# Patient Record
Sex: MALE | Race: OTHER | NOT HISPANIC OR LATINO | URBAN - METROPOLITAN AREA
[De-identification: names, ages, dates, MRNs, and addresses within clinical notes are randomized per-mention and may not be internally consistent; named-entity substitution may affect disease eponyms.]

---

## 2021-08-12 ENCOUNTER — INPATIENT (INPATIENT)
Facility: HOSPITAL | Age: 31
LOS: 2 days | Discharge: HOME | DRG: 917 | End: 2021-08-15
Attending: HOSPITALIST | Admitting: HOSPITALIST
Payer: MEDICAID

## 2021-08-12 VITALS
RESPIRATION RATE: 14 BRPM | HEART RATE: 119 BPM | TEMPERATURE: 98 F | OXYGEN SATURATION: 96 % | SYSTOLIC BLOOD PRESSURE: 116 MMHG | DIASTOLIC BLOOD PRESSURE: 79 MMHG

## 2021-08-12 DIAGNOSIS — D72.829 ELEVATED WHITE BLOOD CELL COUNT, UNSPECIFIED: ICD-10-CM

## 2021-08-12 DIAGNOSIS — R06.89 OTHER ABNORMALITIES OF BREATHING: ICD-10-CM

## 2021-08-12 DIAGNOSIS — T40.2X1A POISONING BY OTHER OPIOIDS, ACCIDENTAL (UNINTENTIONAL), INITIAL ENCOUNTER: ICD-10-CM
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
## 2021-08-12 DIAGNOSIS — G92 TOXIC ENCEPHALOPATHY: ICD-10-CM

## 2021-08-12 LAB
ALBUMIN SERPL ELPH-MCNC: 3.8 G/DL — SIGNIFICANT CHANGE UP (ref 3.5–5)
ALP SERPL-CCNC: 85 U/L — SIGNIFICANT CHANGE UP (ref 40–120)
ALT FLD-CCNC: 41 U/L DA — SIGNIFICANT CHANGE UP (ref 10–60)
ANION GAP SERPL CALC-SCNC: 5 MMOL/L — SIGNIFICANT CHANGE UP (ref 5–17)
AST SERPL-CCNC: 24 U/L — SIGNIFICANT CHANGE UP (ref 10–40)
BASOPHILS # BLD AUTO: 0.05 K/UL — SIGNIFICANT CHANGE UP (ref 0–0.2)
BASOPHILS NFR BLD AUTO: 0.3 % — SIGNIFICANT CHANGE UP (ref 0–2)
BILIRUB SERPL-MCNC: 0.5 MG/DL — SIGNIFICANT CHANGE UP (ref 0.2–1.2)
BUN SERPL-MCNC: 9 MG/DL — SIGNIFICANT CHANGE UP (ref 7–18)
CALCIUM SERPL-MCNC: 9.3 MG/DL — SIGNIFICANT CHANGE UP (ref 8.4–10.5)
CHLORIDE SERPL-SCNC: 107 MMOL/L — SIGNIFICANT CHANGE UP (ref 96–108)
CO2 SERPL-SCNC: 30 MMOL/L — SIGNIFICANT CHANGE UP (ref 22–31)
CREAT SERPL-MCNC: 1.04 MG/DL — SIGNIFICANT CHANGE UP (ref 0.5–1.3)
EOSINOPHIL # BLD AUTO: 0.12 K/UL — SIGNIFICANT CHANGE UP (ref 0–0.5)
EOSINOPHIL NFR BLD AUTO: 0.8 % — SIGNIFICANT CHANGE UP (ref 0–6)
GLUCOSE SERPL-MCNC: 114 MG/DL — HIGH (ref 70–99)
HCT VFR BLD CALC: 44 % — SIGNIFICANT CHANGE UP (ref 39–50)
HGB BLD-MCNC: 14.6 G/DL — SIGNIFICANT CHANGE UP (ref 13–17)
IMM GRANULOCYTES NFR BLD AUTO: 0.8 % — SIGNIFICANT CHANGE UP (ref 0–1.5)
LYMPHOCYTES # BLD AUTO: 1.86 K/UL — SIGNIFICANT CHANGE UP (ref 1–3.3)
LYMPHOCYTES # BLD AUTO: 12.8 % — LOW (ref 13–44)
MCHC RBC-ENTMCNC: 28.2 PG — SIGNIFICANT CHANGE UP (ref 27–34)
MCHC RBC-ENTMCNC: 33.2 GM/DL — SIGNIFICANT CHANGE UP (ref 32–36)
MCV RBC AUTO: 84.9 FL — SIGNIFICANT CHANGE UP (ref 80–100)
MONOCYTES # BLD AUTO: 0.98 K/UL — HIGH (ref 0–0.9)
MONOCYTES NFR BLD AUTO: 6.7 % — SIGNIFICANT CHANGE UP (ref 2–14)
NEUTROPHILS # BLD AUTO: 11.41 K/UL — HIGH (ref 1.8–7.4)
NEUTROPHILS NFR BLD AUTO: 78.6 % — HIGH (ref 43–77)
NRBC # BLD: 0 /100 WBCS — SIGNIFICANT CHANGE UP (ref 0–0)
PLATELET # BLD AUTO: 277 K/UL — SIGNIFICANT CHANGE UP (ref 150–400)
POTASSIUM SERPL-MCNC: 3.9 MMOL/L — SIGNIFICANT CHANGE UP (ref 3.5–5.3)
POTASSIUM SERPL-SCNC: 3.9 MMOL/L — SIGNIFICANT CHANGE UP (ref 3.5–5.3)
PROT SERPL-MCNC: 7.2 G/DL — SIGNIFICANT CHANGE UP (ref 6–8.3)
RBC # BLD: 5.18 M/UL — SIGNIFICANT CHANGE UP (ref 4.2–5.8)
RBC # FLD: 12.8 % — SIGNIFICANT CHANGE UP (ref 10.3–14.5)
SODIUM SERPL-SCNC: 142 MMOL/L — SIGNIFICANT CHANGE UP (ref 135–145)
WBC # BLD: 14.53 K/UL — HIGH (ref 3.8–10.5)
WBC # FLD AUTO: 14.53 K/UL — HIGH (ref 3.8–10.5)

## 2021-08-12 RX ORDER — SODIUM CHLORIDE 9 MG/ML
3 INJECTION INTRAMUSCULAR; INTRAVENOUS; SUBCUTANEOUS EVERY 8 HOURS
Refills: 0 | Status: DISCONTINUED | OUTPATIENT
Start: 2021-08-12 | End: 2021-08-15

## 2021-08-12 RX ORDER — SODIUM CHLORIDE 9 MG/ML
1000 INJECTION INTRAMUSCULAR; INTRAVENOUS; SUBCUTANEOUS ONCE
Refills: 0 | Status: COMPLETED | OUTPATIENT
Start: 2021-08-12 | End: 2021-08-12

## 2021-08-12 RX ORDER — NALOXONE HYDROCHLORIDE 4 MG/.1ML
0.4 SPRAY NASAL ONCE
Refills: 0 | Status: COMPLETED | OUTPATIENT
Start: 2021-08-12 | End: 2021-08-12

## 2021-08-12 RX ADMIN — NALOXONE HYDROCHLORIDE 0.4 MILLIGRAM(S): 4 SPRAY NASAL at 23:47

## 2021-08-12 RX ADMIN — NALOXONE HYDROCHLORIDE 0.4 MILLIGRAM(S): 4 SPRAY NASAL at 22:20

## 2021-08-12 RX ADMIN — SODIUM CHLORIDE 1000 MILLILITER(S): 9 INJECTION INTRAMUSCULAR; INTRAVENOUS; SUBCUTANEOUS at 23:44

## 2021-08-12 NOTE — ED PROVIDER NOTE - PROGRESS NOTE DETAILS
After accepting pt to the med floor, nurse admin made med team aware that narcan drips must go to ICU. ICU consulted. Pt now taken off narcan drip 30 minutes ago and still maintaining normal RR and mentation. ICU likely to reject. Will change dispo if they accept. Pt stable.

## 2021-08-12 NOTE — ED ADULT TRIAGE NOTE - CHIEF COMPLAINT QUOTE
per EMS friend called states, Patient came out of the bathroom and passed out in bed.  Responsive to vigorous pain stimuli.  Narcan 0.5 given @ about 820Pm w/ slow response

## 2021-08-12 NOTE — ED PROVIDER NOTE - CLINICAL SUMMARY MEDICAL DECISION MAKING FREE TEXT BOX
Patient presenting with likely opioid overdose with clear response to Narcan in the emergency room. Patient now talking and breathing comfortably. Will observe for four hours. Patient stable and will reassess. Patient presenting with likely opioid overdose with clear response to Narcan in the emergency room. Patient now talking and breathing comfortably. Will observe for four hours. Patient stable and will reassess.    Pt required 2x 0.4 IVP narcan in the ED and then placed on narcan drip at 1mg/hr. Pt now comfortable and conversant with nml RR. Admitting to medicine as pt is currently dependent on narcan drip. Pt stable and endorsed to med team.

## 2021-08-12 NOTE — ED PROVIDER NOTE - OBJECTIVE STATEMENT
29 y/o male with history of heroin abuse, presenting with overdose on heroin. Friend called 911 after seeing patient collapse after leaving bathroom. Patient received 0.5 mg Narcan from EMS at 8:20 PM with positive response. Patient states he was heroin tonight and denies any other substances. Patient now conversant s/p second dose Narcan in the ER and denies any complaints. Patient denies recent fever, chest pain, shortness of breath, focal numbness/weakness, nausea, vomiting, diarrhea, or any other recent illnesses and hospitalizations.

## 2021-08-12 NOTE — ED PROVIDER NOTE - PHYSICAL EXAMINATION
Vital Signs Reviewed--Respiratory rate 6 initially, s/p 0.4 mg Narcan respiratory rate 20  GEN: Comfortable, NAD, AAOx3  HEENT: NCAT, MMM, Neck Supple  RESP: CTAB, No rales/rhonchi/wheezing  CV: RRR, S1S2, No murmurs  ABD: No TTP, Soft, ND, No masses, No CVA Tenderness  Extrem/Skin: Equal pulses bilat, No cyanosis/edema/rashes  Neuro: No focal deficits

## 2021-08-13 DIAGNOSIS — Z29.9 ENCOUNTER FOR PROPHYLACTIC MEASURES, UNSPECIFIED: ICD-10-CM

## 2021-08-13 DIAGNOSIS — T40.2X1A POISONING BY OTHER OPIOIDS, ACCIDENTAL (UNINTENTIONAL), INITIAL ENCOUNTER: ICD-10-CM

## 2021-08-13 DIAGNOSIS — D72.829 ELEVATED WHITE BLOOD CELL COUNT, UNSPECIFIED: ICD-10-CM

## 2021-08-13 LAB
ALBUMIN SERPL ELPH-MCNC: 3.5 G/DL — SIGNIFICANT CHANGE UP (ref 3.5–5)
ALP SERPL-CCNC: 84 U/L — SIGNIFICANT CHANGE UP (ref 40–120)
ALT FLD-CCNC: 34 U/L DA — SIGNIFICANT CHANGE UP (ref 10–60)
AMPHET UR-MCNC: POSITIVE
ANION GAP SERPL CALC-SCNC: 2 MMOL/L — LOW (ref 5–17)
ANION GAP SERPL CALC-SCNC: 6 MMOL/L — SIGNIFICANT CHANGE UP (ref 5–17)
APAP SERPL-MCNC: <10 UG/ML — SIGNIFICANT CHANGE UP (ref 10–30)
AST SERPL-CCNC: 19 U/L — SIGNIFICANT CHANGE UP (ref 10–40)
BARBITURATES UR SCN-MCNC: NEGATIVE — SIGNIFICANT CHANGE UP
BASE EXCESS BLDA CALC-SCNC: 3.4 MMOL/L — HIGH (ref -2–3)
BENZODIAZ UR-MCNC: NEGATIVE — SIGNIFICANT CHANGE UP
BILIRUB SERPL-MCNC: 0.8 MG/DL — SIGNIFICANT CHANGE UP (ref 0.2–1.2)
BLOOD GAS COMMENTS ARTERIAL: SIGNIFICANT CHANGE UP
BUN SERPL-MCNC: 5 MG/DL — LOW (ref 7–18)
BUN SERPL-MCNC: 6 MG/DL — LOW (ref 7–18)
CALCIUM SERPL-MCNC: 8.6 MG/DL — SIGNIFICANT CHANGE UP (ref 8.4–10.5)
CALCIUM SERPL-MCNC: 8.7 MG/DL — SIGNIFICANT CHANGE UP (ref 8.4–10.5)
CHLORIDE SERPL-SCNC: 106 MMOL/L — SIGNIFICANT CHANGE UP (ref 96–108)
CHLORIDE SERPL-SCNC: 107 MMOL/L — SIGNIFICANT CHANGE UP (ref 96–108)
CO2 SERPL-SCNC: 26 MMOL/L — SIGNIFICANT CHANGE UP (ref 22–31)
CO2 SERPL-SCNC: 30 MMOL/L — SIGNIFICANT CHANGE UP (ref 22–31)
COCAINE METAB.OTHER UR-MCNC: NEGATIVE — SIGNIFICANT CHANGE UP
CREAT SERPL-MCNC: 0.66 MG/DL — SIGNIFICANT CHANGE UP (ref 0.5–1.3)
CREAT SERPL-MCNC: 0.68 MG/DL — SIGNIFICANT CHANGE UP (ref 0.5–1.3)
ETHANOL SERPL-MCNC: <3 MG/DL — SIGNIFICANT CHANGE UP (ref 0–10)
GLUCOSE SERPL-MCNC: 85 MG/DL — SIGNIFICANT CHANGE UP (ref 70–99)
GLUCOSE SERPL-MCNC: 85 MG/DL — SIGNIFICANT CHANGE UP (ref 70–99)
HCO3 BLDA-SCNC: 30 MMOL/L — HIGH (ref 21–28)
HCT VFR BLD CALC: 43.4 % — SIGNIFICANT CHANGE UP (ref 39–50)
HGB BLD-MCNC: 14.2 G/DL — SIGNIFICANT CHANGE UP (ref 13–17)
HOROWITZ INDEX BLDA+IHG-RTO: 21 — SIGNIFICANT CHANGE UP
MCHC RBC-ENTMCNC: 28 PG — SIGNIFICANT CHANGE UP (ref 27–34)
MCHC RBC-ENTMCNC: 32.7 GM/DL — SIGNIFICANT CHANGE UP (ref 32–36)
MCV RBC AUTO: 85.4 FL — SIGNIFICANT CHANGE UP (ref 80–100)
METHADONE UR-MCNC: NEGATIVE — SIGNIFICANT CHANGE UP
NRBC # BLD: 0 /100 WBCS — SIGNIFICANT CHANGE UP (ref 0–0)
OPIATES UR-MCNC: POSITIVE
PCO2 BLDA: 50 MMHG — HIGH (ref 35–48)
PCP SPEC-MCNC: SIGNIFICANT CHANGE UP
PCP UR-MCNC: NEGATIVE — SIGNIFICANT CHANGE UP
PH BLDA: 7.38 — SIGNIFICANT CHANGE UP (ref 7.35–7.45)
PLATELET # BLD AUTO: 251 K/UL — SIGNIFICANT CHANGE UP (ref 150–400)
PO2 BLDA: 103 MMHG — SIGNIFICANT CHANGE UP (ref 83–108)
POTASSIUM SERPL-MCNC: 3.6 MMOL/L — SIGNIFICANT CHANGE UP (ref 3.5–5.3)
POTASSIUM SERPL-MCNC: 3.7 MMOL/L — SIGNIFICANT CHANGE UP (ref 3.5–5.3)
POTASSIUM SERPL-SCNC: 3.6 MMOL/L — SIGNIFICANT CHANGE UP (ref 3.5–5.3)
POTASSIUM SERPL-SCNC: 3.7 MMOL/L — SIGNIFICANT CHANGE UP (ref 3.5–5.3)
PROT SERPL-MCNC: 6.8 G/DL — SIGNIFICANT CHANGE UP (ref 6–8.3)
RBC # BLD: 5.08 M/UL — SIGNIFICANT CHANGE UP (ref 4.2–5.8)
RBC # FLD: 12.9 % — SIGNIFICANT CHANGE UP (ref 10.3–14.5)
SALICYLATES SERPL-MCNC: <1.7 MG/DL — LOW (ref 2.8–20)
SAO2 % BLDA: 99 % — SIGNIFICANT CHANGE UP
SARS-COV-2 RNA SPEC QL NAA+PROBE: SIGNIFICANT CHANGE UP
SODIUM SERPL-SCNC: 138 MMOL/L — SIGNIFICANT CHANGE UP (ref 135–145)
SODIUM SERPL-SCNC: 139 MMOL/L — SIGNIFICANT CHANGE UP (ref 135–145)
THC UR QL: NEGATIVE — SIGNIFICANT CHANGE UP
WBC # BLD: 12.59 K/UL — HIGH (ref 3.8–10.5)
WBC # FLD AUTO: 12.59 K/UL — HIGH (ref 3.8–10.5)

## 2021-08-13 PROCEDURE — 99222 1ST HOSP IP/OBS MODERATE 55: CPT

## 2021-08-13 PROCEDURE — 12345: CPT | Mod: NC

## 2021-08-13 RX ORDER — NALOXONE HYDROCHLORIDE 4 MG/.1ML
1 SPRAY NASAL
Qty: 2 | Refills: 0 | Status: DISCONTINUED | OUTPATIENT
Start: 2021-08-13 | End: 2021-08-13

## 2021-08-13 RX ORDER — NALOXONE HYDROCHLORIDE 4 MG/.1ML
0.4 SPRAY NASAL ONCE
Refills: 0 | Status: COMPLETED | OUTPATIENT
Start: 2021-08-13 | End: 2021-08-13

## 2021-08-13 RX ORDER — SODIUM CHLORIDE 9 MG/ML
1000 INJECTION INTRAMUSCULAR; INTRAVENOUS; SUBCUTANEOUS
Refills: 0 | Status: DISCONTINUED | OUTPATIENT
Start: 2021-08-13 | End: 2021-08-14

## 2021-08-13 RX ADMIN — SODIUM CHLORIDE 3 MILLILITER(S): 9 INJECTION INTRAMUSCULAR; INTRAVENOUS; SUBCUTANEOUS at 21:12

## 2021-08-13 RX ADMIN — SODIUM CHLORIDE 150 MILLILITER(S): 9 INJECTION INTRAMUSCULAR; INTRAVENOUS; SUBCUTANEOUS at 13:47

## 2021-08-13 RX ADMIN — NALOXONE HYDROCHLORIDE 250 MG/HR: 4 SPRAY NASAL at 01:14

## 2021-08-13 RX ADMIN — SODIUM CHLORIDE 3 MILLILITER(S): 9 INJECTION INTRAMUSCULAR; INTRAVENOUS; SUBCUTANEOUS at 14:37

## 2021-08-13 RX ADMIN — SODIUM CHLORIDE 3 MILLILITER(S): 9 INJECTION INTRAMUSCULAR; INTRAVENOUS; SUBCUTANEOUS at 05:49

## 2021-08-13 NOTE — H&P ADULT - PROBLEM SELECTOR PLAN 3
No DVT ppx   IMPROVE VTE Individual Risk Assessment  RISK                                                                Points  [  ] Previous VTE                                                  3  [  ] Thrombophilia                                               2  [  ] Lower limb paralysis                                      2        (unable to hold up >15 seconds)    [  ] Current Cancer                                              2         (within 6 months)  [  ] Immobilization > 24 hrs                                1  [  ] ICU/CCU stay > 24 hours                              1  [  ] Age > 60                                                      1  IMPROVE VTE Score ____0_____

## 2021-08-13 NOTE — CONSULT NOTE ADULT - ASSESSMENT
Patient, a 30 year old male with no significant PMH presents to the ED with drug overdose due to heroine. Patient received narcan x 2 and was initially on narcan drip in ED, now off the drip. ICU was consulted for heroin overdose requiring narcan drip.      ASSESSMENT:  -Acute encephalopathy due to drug overdose  -Heroine overdose  -Bradypnea  -Leucocytosis    PLAN:    Heroine overdose:  -Patient presented with heroine overdose along with GHP  -He was bradypneic with pinpoint pupils on exam   -Utox + for opioids and amphetamines  -s/p narcan by EMS and in ED  -now off narcan drip   -respiratory status improved, pt awake and conscious  -ABGs showed 7.38/50/103/30  -since patient does not need narcan drip anymore, patient can be admitted to medical floor  -closely monitor on floor with concern for airway protection  -recommend Social work consullt  -continue with IV hydration    Patient, a 30 year old male with no significant PMH presents to the ED with drug overdose due to heroine. Patient received narcan x 2 and was initially on narcan drip in ED, now off the drip. ICU was consulted for heroin overdose requiring narcan drip.      ASSESSMENT:  -Acute encephalopathy due to drug overdose  -Heroine overdose  -Bradypnea  -Leucocytosis    PLAN:    Heroine overdose:  -Patient presented with heroine overdose along with GHP  -He was bradypneic with pinpoint pupils on exam   -Utox + for opioids and amphetamines  -s/p narcan by EMS and in ED  -now off narcan drip   -respiratory status improved, pt awake and conscious  -ABGs showed 7.38/50/103/30  -since patient does not need narcan drip anymore, patient can be admitted to medical floor  -closely monitor on floor with concern for airway protection  -recommend Social work consullt  -continue with IV hydration     Leucocytosis:  -WBC count 14.5k  -likely reactive  -monitor CBC     Patient, a 30 year old male with no significant PMH presents to the ED with drug overdose due to heroine. Patient received narcan x 2 and was initially on narcan drip in ED, now off the drip. ICU was consulted for heroin overdose requiring narcan drip.      ASSESSMENT:  -Acute encephalopathy due to drug overdose  -Heroine overdose  -Bradypnea  -Leucocytosis    PLAN:    Heroine overdose:  -Patient presented with heroine overdose along with GHP  -He was bradypneic with pinpoint pupils on exam   -Utox + for opioids and amphetamines  -s/p narcan by EMS and in ED  -now off narcan drip   -respiratory status improved, pt awake and conscious  -ABGs showed 7.38/50/103/30  -since patient does not need narcan drip anymore, patient can be admitted to medical floor  -closely monitor on floor with concern for airway protection  -recommend Social work consullt  -continue with IV hydration     Leucocytosis:  -WBC count 14.5k  -likely reactive  -monitor CBC    Disposition: downgrade to floor

## 2021-08-13 NOTE — CONSULT NOTE ADULT - SUBJECTIVE AND OBJECTIVE BOX
Patient is a 30y old  Male who presents with a chief complaint of     HPI:      PAST MEDICAL & SURGICAL HISTORY:  No pertinent past medical history    No significant past surgical history        SOCIAL HX:   Smoking                         ETOH                            Other    FAMILY HISTORY:  :  No known cardiovacular family hisotry     ROS:  See HPI     Allergies    Allergy Status Unknown    Intolerances          PHYSICAL EXAM    ICU Vital Signs Last 24 Hrs  T(C): 36.4 (13 Aug 2021 02:15), Max: 36.4 (12 Aug 2021 20:52)  T(F): 97.6 (13 Aug 2021 02:15), Max: 97.6 (13 Aug 2021 02:15)  HR: 90 (13 Aug 2021 02:15) (79 - 119)  BP: 124/66 (13 Aug 2021 02:15) (116/79 - 137/87)  BP(mean): --  ABP: --  ABP(mean): --  RR: 12 (13 Aug 2021 02:15) (9 - 20)  SpO2: 100% (13 Aug 2021 02:15) (96% - 100%)      General: Not in distress  HEENT:  MALCOM              Lymphatic system: No LN  Lungs: Bilateral BS  Cardiovascular: Regular  Gastrointestinal: Soft, Positive BS  Musculoskeletal: No clubbing.  Moves all extremities.    Skin: Warm.  Intact  Neurological: No motor or sensory deficit         LABS:                          14.6   14.53 )-----------( 277      ( 12 Aug 2021 23:19 )             44.0                                               08-12    142  |  107  |  9   ----------------------------<  114<H>  3.9   |  30  |  1.04    Ca    9.3      12 Aug 2021 23:19    TPro  7.2  /  Alb  3.8  /  TBili  0.5  /  DBili  x   /  AST  24  /  ALT  41  /  AlkPhos  85  08-12                                                                                           LIVER FUNCTIONS - ( 12 Aug 2021 23:19 )  Alb: 3.8 g/dL / Pro: 7.2 g/dL / ALK PHOS: 85 U/L / ALT: 41 U/L DA / AST: 24 U/L / GGT: x                                                                                                                                   ABG - ( 13 Aug 2021 03:55 )  pH, Arterial: 7.38  pH, Blood: x     /  pCO2: 50    /  pO2: 103   / HCO3: 30    / Base Excess: 3.4   /  SaO2: 99                  CXR:    ECHO:    MEDICATIONS  (STANDING):  naloxone Infusion 1 mG/Hr (250 mL/Hr) IV Continuous <Continuous>  sodium chloride 0.9% lock flush 3 milliLiter(s) IV Push every 8 hours    MEDICATIONS  (PRN):         Patient is a 30y old  Male who presents with a chief complaint of drug overdose/    HPI:  Patient, a 30 year old male with no significant PMH presents to the ED with drug overdose. Patient states he was at a party and had too much to drink. As per ED attending, 'Friend called 911 after seeing patient collapse after leaving bathroom. Patient received 0.5 mg Narcan from EMS at 8:20 PM with positive response. Patient states he was taking heroin tonight and denies any other substances. Patient now conversant s/p second dose Narcan in the ER and denies any complaints. Patient denies recent fever, chest pain, shortness of breath, focal numbness/weakness, nausea, vomiting, diarrhea, or any other recent illnesses and hospitalizations. '        PAST MEDICAL & SURGICAL HISTORY:  No pertinent past medical history    No significant past surgical history        SOCIAL HX:   Smoking                         ETOH                            Other    FAMILY HISTORY:  :  No known cardiovascular family history     ROS:  See HPI     Allergies    Allergy Status Unknown    Intolerances          PHYSICAL EXAM    ICU Vital Signs Last 24 Hrs  T(C): 36.4 (13 Aug 2021 02:15), Max: 36.4 (12 Aug 2021 20:52)  T(F): 97.6 (13 Aug 2021 02:15), Max: 97.6 (13 Aug 2021 02:15)  HR: 90 (13 Aug 2021 02:15) (79 - 119)  BP: 124/66 (13 Aug 2021 02:15) (116/79 - 137/87)  BP(mean): --  ABP: --  ABP(mean): --  RR: 12 (13 Aug 2021 02:15) (9 - 20)  SpO2: 100% (13 Aug 2021 02:15) (96% - 100%)      General: Not in distress  HEENT:  MALCOM              Lymphatic system: No LN  Lungs: Bilateral BS  Cardiovascular: Regular  Gastrointestinal: Soft, Positive BS  Musculoskeletal: No clubbing.  Moves all extremities.    Skin: Warm.  Intact  Neurological: No motor or sensory deficit         LABS:                          14.6   14.53 )-----------( 277      ( 12 Aug 2021 23:19 )             44.0                                               08-12    142  |  107  |  9   ----------------------------<  114<H>  3.9   |  30  |  1.04    Ca    9.3      12 Aug 2021 23:19    TPro  7.2  /  Alb  3.8  /  TBili  0.5  /  DBili  x   /  AST  24  /  ALT  41  /  AlkPhos  85  08-12                                                                                           LIVER FUNCTIONS - ( 12 Aug 2021 23:19 )  Alb: 3.8 g/dL / Pro: 7.2 g/dL / ALK PHOS: 85 U/L / ALT: 41 U/L DA / AST: 24 U/L / GGT: x                                                                                                                                   ABG - ( 13 Aug 2021 03:55 )  pH, Arterial: 7.38  pH, Blood: x     /  pCO2: 50    /  pO2: 103   / HCO3: 30    / Base Excess: 3.4   /  SaO2: 99                  CXR:    ECHO:    MEDICATIONS  (STANDING):  naloxone Infusion 1 mG/Hr (250 mL/Hr) IV Continuous <Continuous>  sodium chloride 0.9% lock flush 3 milliLiter(s) IV Push every 8 hours    MEDICATIONS  (PRN):         Patient is a 30y old  Male who presents with a chief complaint of drug overdose/    HPI:  Patient, a 30 year old male with no significant PMH presents to the ED with drug overdose. Patient states he was at a party and had too much to drink. As per ED attending, 'Friend called 911 after seeing patient collapse after leaving bathroom. Patient received 0.5 mg Narcan from EMS at 8:20 PM with positive response. Patient states he was taking heroin tonight and denies any other substances. Patient now conversant s/p second dose Narcan in the ER and denies any complaints. Patient denies recent fever, chest pain, shortness of breath, focal numbness/weakness, nausea, vomiting, diarrhea, or any other recent illnesses and hospitalizations. '    INTERVAL HISTORY:  Patient was found to be bradypneic with respiratory rate 5-6 in field and saturating 45%. He was given narcan after which respiratory rate improved to 16 and he was placed on 15L oxygen. After the narcan, patient became conscious and improved. In ED on admission his RR was found to  be 5. He received another dose of narcan. Since his respiratory rate kept fluctuating, he was put on narcan drip in ED. ICU was consulted for heroin overdose requiring narcan drip. Patient now off the narcan drip, saturating 100% on 4L NC with respiratory rate 11-16. Patient is arousable but sleepy.       PAST MEDICAL & SURGICAL HISTORY:  No pertinent past medical history    No significant past surgical history        SOCIAL HX:   Smoking                         ETOH                            Other    FAMILY HISTORY:  :  No known cardiovascular family history     ROS:  See HPI     Allergies    Allergy Status Unknown    Intolerances          PHYSICAL EXAM    ICU Vital Signs Last 24 Hrs  T(C): 36.4 (13 Aug 2021 02:15), Max: 36.4 (12 Aug 2021 20:52)  T(F): 97.6 (13 Aug 2021 02:15), Max: 97.6 (13 Aug 2021 02:15)  HR: 90 (13 Aug 2021 02:15) (79 - 119)  BP: 124/66 (13 Aug 2021 02:15) (116/79 - 137/87)  BP(mean): --  ABP: --  ABP(mean): --  RR: 12 (13 Aug 2021 02:15) (9 - 20)  SpO2: 100% (13 Aug 2021 02:15) (96% - 100%)      General: somnolent  HEENT:  pupils constricted           Lymphatic system: No LN  Lungs: Bilateral BS equal, lung clear   Cardiovascular: Regular, no murmurs   Gastrointestinal: Soft, Positive BS  Musculoskeletal: No clubbing.  Moves all extremities.    Skin: Warm.  Intact  Neurological: No motor or sensory deficit         LABS:                          14.6   14.53 )-----------( 277      ( 12 Aug 2021 23:19 )             44.0                                               08-12    142  |  107  |  9   ----------------------------<  114<H>  3.9   |  30  |  1.04    Ca    9.3      12 Aug 2021 23:19    TPro  7.2  /  Alb  3.8  /  TBili  0.5  /  DBili  x   /  AST  24  /  ALT  41  /  AlkPhos  85  08-12                                                                                           LIVER FUNCTIONS - ( 12 Aug 2021 23:19 )  Alb: 3.8 g/dL / Pro: 7.2 g/dL / ALK PHOS: 85 U/L / ALT: 41 U/L DA / AST: 24 U/L / GGT: x                                                                                                                                   ABG - ( 13 Aug 2021 03:55 )  pH, Arterial: 7.38  pH, Blood: x     /  pCO2: 50    /  pO2: 103   / HCO3: 30    / Base Excess: 3.4   /  SaO2: 99                  CXR:    ECHO:    MEDICATIONS  (STANDING):  naloxone Infusion 1 mG/Hr (250 mL/Hr) IV Continuous <Continuous>  sodium chloride 0.9% lock flush 3 milliLiter(s) IV Push every 8 hours    MEDICATIONS  (PRN):

## 2021-08-13 NOTE — ED ADULT NURSE REASSESSMENT NOTE - NS ED NURSE REASSESS COMMENT FT1
pt is aaox2 .no acute distress noted.safety maintained .side rails up .vitals recorded .spo2-98% .pt is NPO .will continue to monitor .

## 2021-08-13 NOTE — H&P ADULT - ATTENDING COMMENTS
Patient is a 30 year old male with no reported PMH who was BIBEMS due to overdose.  Patient is arouseable though only a fair historian.  Patient states that although he previously only used cocaine, on the day of current presentation, he injected an unknown quantity of heroin and also took "G".    T(C): 36.4 (08-13-21 @ 02:15), Max: 36.4 (08-12-21 @ 20:52)  T(F): 97.6 (08-13-21 @ 02:15), Max: 97.6 (08-13-21 @ 02:15)  HR: 90 (08-13-21 @ 02:15) (79 - 119)  BP: 124/66 (08-13-21 @ 02:15) (116/79 - 137/87)  RR: 12 (08-13-21 @ 02:15) (9 - 20)  SpO2: 100% (08-13-21 @ 02:15) (96% - 100%)  Wt(kg): --    P/E: As above MAR    A/P:    Altered Mental Status due to Heroin Overdose:  -s/p Narcan x 3, Narcan drip  -Vital Signs Q4H  -    Leukocytosis:  -Likely a reactive process  -Nevertheless, will send Procalcitonin    Uncontrolled Blood Glucose:  -Hemoglobin A1c with AM labs  -Blood Glucose Monitoring ACHS  -Regular Insulin Sliding Scale ACHS  -Carb Controlled, Heart Healthy, Low Sodium diet as tolerated    GI/DVT PPx:  -Lovenox  -Pepcid

## 2021-08-13 NOTE — H&P ADULT - ASSESSMENT
Patient is a 31 y/o male with no significant PMHx who was BIBEMS to the ED with complaints of unresponsiveness and trouble breathing; admitted for acute opioid overdose.    In the ED   Narcan x 2 given and infusion started   Pt became more responsive and infusion stopped; RR monitoring ongoing

## 2021-08-13 NOTE — CONSULT NOTE ADULT - ATTENDING COMMENTS
Patient seen and examined upon consultation request at 2.30AM. Case discussed with ICU resident and agree with note.  30 year old male with h/o heroine use presented with toxic encephalopathy from heroine use. In the ED he received narcan 0.4 x2 doses and then narcan drip briefly which was discontinued by the ED physician in view of improved respiratory rate and mental status. ICU initially consulted in view of the narcan drip but since does not require it at this time can admit to medicine.

## 2021-08-13 NOTE — H&P ADULT - PROBLEM SELECTOR PLAN 1
Adm with pinpoint pupils, respiratory depression, hx of heroin and GHB use   Good response to Naloxone; s/p Narcan x 2mg total, then Narcan drip  Continue vital Signs Q4H  Monitor for withdrawal and start Clonidine for symtomatic trt/HTN  SW consult Adm with pinpoint pupils, respiratory depression, hx of heroin and GHB use   Good response to Naloxone; s/p Narcan x 2mg total, then Narcan drip  Continue vital Signs Q4H  Monitor for withdrawal and start Clonidine for symtomatic trt/HTN  NPO until mental status fully improves   SW consult Adm with pinpoint pupils, respiratory depression, hx of heroin and GHB use   Good response to Naloxone; s/p Narcan x 2mg total, then Narcan drip  Continue vital Signs Q4H  Monitor for withdrawal and start Clonidine for symptomatic trt/HTN  NPO until mental status fully improves   SW consult

## 2021-08-13 NOTE — H&P ADULT - HISTORY OF PRESENT ILLNESS
Patient is a 29 y/o male with no significant PMHx who was BIBEMS to the ED with complaints of unresponsiveness and trouble breathing. Patient was unable to provide much history due to intoxication and history is mostly from documentation. As per EMS documents, pts friend had called 911 for the above complaints and he was found to be lying in bed, responsive only to pain, with constricted pupils, and snorting respirations 5-6/min. This improved to 18/min after 0.5mg Narcan x2, and he became conversant. Once reaching the ED, he had to get another 1mg Narcan due to poor respiratory effort and was started on Narcan Infusion for possible long acting opioid use. Patient drowsy but arousable after Narcan infusion stopped and reports that he had too much to drink, but denies any complaints such as recent fever, chest pain, shortness of breath, focal numbness/weakness, or N/V/D. Of note: He also reports using GHB (roofies).

## 2021-08-13 NOTE — H&P ADULT - NSHPPHYSICALEXAM_GEN_ALL_CORE
GENERAL APPEARANCE: Well developed, AA0x3, in NAD   THROAT: Oral cavity and pharynx normal. No inflammation, swelling, exudate, or lesions.  CARDIAC: Normal S1 and S2. No S3, S4 or murmurs. Rhythm is regular. There is no peripheral edema, cyanosis or pallor.  LUNGS: Clear to auscultation and percussion without rales, rhonchi, wheezing or diminished breath sounds.  ABDOMEN: Positive bowel sounds. Soft, nondistended, nontender. No guarding or rebound. No masses.  EXTREMITIES: No significant deformity or joint abnormality. No edema. Peripheral pulses intact. No varicosities.  NEUROLOGICAL: CN II-XII intact. Strength and sensation symmetric and intact throughout. Reflexes 2+ throughout.   SKIN: No skin breakdown, lesions or rashes noted

## 2021-08-14 LAB
ALBUMIN SERPL ELPH-MCNC: 3.6 G/DL — SIGNIFICANT CHANGE UP (ref 3.5–5)
ALP SERPL-CCNC: 75 U/L — SIGNIFICANT CHANGE UP (ref 40–120)
ALT FLD-CCNC: 28 U/L DA — SIGNIFICANT CHANGE UP (ref 10–60)
ANION GAP SERPL CALC-SCNC: 6 MMOL/L — SIGNIFICANT CHANGE UP (ref 5–17)
APPEARANCE UR: CLEAR — SIGNIFICANT CHANGE UP
AST SERPL-CCNC: 15 U/L — SIGNIFICANT CHANGE UP (ref 10–40)
BILIRUB SERPL-MCNC: 0.7 MG/DL — SIGNIFICANT CHANGE UP (ref 0.2–1.2)
BILIRUB UR-MCNC: NEGATIVE — SIGNIFICANT CHANGE UP
BUN SERPL-MCNC: 5 MG/DL — LOW (ref 7–18)
CALCIUM SERPL-MCNC: 8.3 MG/DL — LOW (ref 8.4–10.5)
CHLORIDE SERPL-SCNC: 107 MMOL/L — SIGNIFICANT CHANGE UP (ref 96–108)
CO2 SERPL-SCNC: 28 MMOL/L — SIGNIFICANT CHANGE UP (ref 22–31)
COLOR SPEC: YELLOW — SIGNIFICANT CHANGE UP
COVID-19 SPIKE DOMAIN AB INTERP: POSITIVE
COVID-19 SPIKE DOMAIN ANTIBODY RESULT: >250 U/ML — HIGH
CREAT SERPL-MCNC: 0.67 MG/DL — SIGNIFICANT CHANGE UP (ref 0.5–1.3)
DIFF PNL FLD: NEGATIVE — SIGNIFICANT CHANGE UP
GLUCOSE SERPL-MCNC: 88 MG/DL — SIGNIFICANT CHANGE UP (ref 70–99)
GLUCOSE UR QL: NEGATIVE — SIGNIFICANT CHANGE UP
HCT VFR BLD CALC: 42.4 % — SIGNIFICANT CHANGE UP (ref 39–50)
HGB BLD-MCNC: 13.8 G/DL — SIGNIFICANT CHANGE UP (ref 13–17)
KETONES UR-MCNC: NEGATIVE — SIGNIFICANT CHANGE UP
LEUKOCYTE ESTERASE UR-ACNC: NEGATIVE — SIGNIFICANT CHANGE UP
MCHC RBC-ENTMCNC: 27.8 PG — SIGNIFICANT CHANGE UP (ref 27–34)
MCHC RBC-ENTMCNC: 32.5 GM/DL — SIGNIFICANT CHANGE UP (ref 32–36)
MCV RBC AUTO: 85.3 FL — SIGNIFICANT CHANGE UP (ref 80–100)
NITRITE UR-MCNC: NEGATIVE — SIGNIFICANT CHANGE UP
NRBC # BLD: 0 /100 WBCS — SIGNIFICANT CHANGE UP (ref 0–0)
PH UR: 6.5 — SIGNIFICANT CHANGE UP (ref 5–8)
PLATELET # BLD AUTO: 255 K/UL — SIGNIFICANT CHANGE UP (ref 150–400)
POTASSIUM SERPL-MCNC: 3.7 MMOL/L — SIGNIFICANT CHANGE UP (ref 3.5–5.3)
POTASSIUM SERPL-SCNC: 3.7 MMOL/L — SIGNIFICANT CHANGE UP (ref 3.5–5.3)
PROCALCITONIN SERPL-MCNC: 0.03 NG/ML — SIGNIFICANT CHANGE UP (ref 0.02–0.1)
PROT SERPL-MCNC: 6.4 G/DL — SIGNIFICANT CHANGE UP (ref 6–8.3)
PROT UR-MCNC: NEGATIVE — SIGNIFICANT CHANGE UP
RBC # BLD: 4.97 M/UL — SIGNIFICANT CHANGE UP (ref 4.2–5.8)
RBC # FLD: 13.2 % — SIGNIFICANT CHANGE UP (ref 10.3–14.5)
SARS-COV-2 IGG+IGM SERPL QL IA: >250 U/ML — HIGH
SARS-COV-2 IGG+IGM SERPL QL IA: POSITIVE
SODIUM SERPL-SCNC: 141 MMOL/L — SIGNIFICANT CHANGE UP (ref 135–145)
SP GR SPEC: 1.01 — SIGNIFICANT CHANGE UP (ref 1.01–1.02)
UROBILINOGEN FLD QL: NEGATIVE — SIGNIFICANT CHANGE UP
WBC # BLD: 8.95 K/UL — SIGNIFICANT CHANGE UP (ref 3.8–10.5)
WBC # FLD AUTO: 8.95 K/UL — SIGNIFICANT CHANGE UP (ref 3.8–10.5)

## 2021-08-14 PROCEDURE — 96374 THER/PROPH/DIAG INJ IV PUSH: CPT

## 2021-08-14 PROCEDURE — 84145 PROCALCITONIN (PCT): CPT

## 2021-08-14 PROCEDURE — 36415 COLL VENOUS BLD VENIPUNCTURE: CPT

## 2021-08-14 PROCEDURE — 82962 GLUCOSE BLOOD TEST: CPT

## 2021-08-14 PROCEDURE — 87635 SARS-COV-2 COVID-19 AMP PRB: CPT

## 2021-08-14 PROCEDURE — 80307 DRUG TEST PRSMV CHEM ANLYZR: CPT

## 2021-08-14 PROCEDURE — 86769 SARS-COV-2 COVID-19 ANTIBODY: CPT

## 2021-08-14 PROCEDURE — 71045 X-RAY EXAM CHEST 1 VIEW: CPT | Mod: 26

## 2021-08-14 PROCEDURE — 99285 EMERGENCY DEPT VISIT HI MDM: CPT | Mod: 25

## 2021-08-14 PROCEDURE — 96376 TX/PRO/DX INJ SAME DRUG ADON: CPT

## 2021-08-14 PROCEDURE — 71045 X-RAY EXAM CHEST 1 VIEW: CPT

## 2021-08-14 PROCEDURE — 99053 MED SERV 10PM-8AM 24 HR FAC: CPT

## 2021-08-14 PROCEDURE — 81003 URINALYSIS AUTO W/O SCOPE: CPT

## 2021-08-14 PROCEDURE — 82803 BLOOD GASES ANY COMBINATION: CPT

## 2021-08-14 PROCEDURE — 85027 COMPLETE CBC AUTOMATED: CPT

## 2021-08-14 PROCEDURE — 99233 SBSQ HOSP IP/OBS HIGH 50: CPT

## 2021-08-14 PROCEDURE — 80053 COMPREHEN METABOLIC PANEL: CPT

## 2021-08-14 PROCEDURE — 85025 COMPLETE CBC W/AUTO DIFF WBC: CPT

## 2021-08-14 PROCEDURE — 80048 BASIC METABOLIC PNL TOTAL CA: CPT

## 2021-08-14 RX ORDER — SODIUM CHLORIDE 9 MG/ML
1000 INJECTION INTRAMUSCULAR; INTRAVENOUS; SUBCUTANEOUS
Refills: 0 | Status: DISCONTINUED | OUTPATIENT
Start: 2021-08-14 | End: 2021-08-15

## 2021-08-14 RX ORDER — ENOXAPARIN SODIUM 100 MG/ML
40 INJECTION SUBCUTANEOUS DAILY
Refills: 0 | Status: DISCONTINUED | OUTPATIENT
Start: 2021-08-14 | End: 2021-08-15

## 2021-08-14 RX ADMIN — SODIUM CHLORIDE 3 MILLILITER(S): 9 INJECTION INTRAMUSCULAR; INTRAVENOUS; SUBCUTANEOUS at 14:00

## 2021-08-14 RX ADMIN — SODIUM CHLORIDE 150 MILLILITER(S): 9 INJECTION INTRAMUSCULAR; INTRAVENOUS; SUBCUTANEOUS at 01:46

## 2021-08-14 RX ADMIN — SODIUM CHLORIDE 100 MILLILITER(S): 9 INJECTION INTRAMUSCULAR; INTRAVENOUS; SUBCUTANEOUS at 11:14

## 2021-08-14 RX ADMIN — SODIUM CHLORIDE 3 MILLILITER(S): 9 INJECTION INTRAMUSCULAR; INTRAVENOUS; SUBCUTANEOUS at 06:51

## 2021-08-14 RX ADMIN — ENOXAPARIN SODIUM 40 MILLIGRAM(S): 100 INJECTION SUBCUTANEOUS at 11:14

## 2021-08-14 NOTE — PROGRESS NOTE ADULT - SUBJECTIVE AND OBJECTIVE BOX
Patient seen and examined this morning. A little more alert today but still dosing off easily, Nearly oriented x 3. States he feels better but still tired. Responds to questions appropriately, states he ate well this morning and is starting to walk around a bit. Counselled on drug cessation once again, did not seem very receptive. Still awaiting evaluation by JAZIEL.     enoxaparin Injectable 40 milliGRAM(s) SubCutaneous daily  sodium chloride 0.9% lock flush 3 milliLiter(s) IV Push every 8 hours  sodium chloride 0.9%. 1000 milliLiter(s) IV Continuous <Continuous>      VITALS:  Vital Signs Last 24 Hrs  T(C): 37 (14 Aug 2021 13:42), Max: 37.4 (14 Aug 2021 04:48)  T(F): 98.6 (14 Aug 2021 13:42), Max: 99.4 (14 Aug 2021 04:48)  HR: 79 (14 Aug 2021 13:42) (64 - 79)  BP: 117/71 (14 Aug 2021 13:42) (107/60 - 121/79)  BP(mean): --  RR: 17 (14 Aug 2021 13:42) (17 - 18)  SpO2: 98% (14 Aug 2021 13:42) (95% - 98%)    EXAM:  GEN: young male, in no acute distress, dozing off during conversation  CVS: rrr, normal s1/s2  RESP: clear bilaterally, no w/r/r, good air entry, RR ~15  ABD: soft, nontender, nondistended, normoactive bowel sounds  EXT: no LE edema  NEURO: aaox3    LABS:                        13.8   8.95  )-----------( 255      ( 14 Aug 2021 11:41 )             42.4     08-14    141  |  107  |  5<L>  ----------------------------<  88  3.7   |  28  |  0.67    Ca    8.3<L>      14 Aug 2021 11:41    TPro  6.4  /  Alb  3.6  /  TBili  0.7  /  DBili  x   /  AST  15  /  ALT  28  /  AlkPhos  75  08-14      IMAGING: reviewed

## 2021-08-14 NOTE — PROGRESS NOTE ADULT - ASSESSMENT
31 y/o male who is admitted after overdosing on Heroin requiring Narcan x 3 and Narcan drip. ICU was consulted and felt he was stable for the floor. Patient admits that he was also smoking crystal meth, and claims that he has injected meth before but this is the first time he's injected Heroin. States he lives with his cousin and just started working as a . A little more alert and now nearly oriented x 3 but still dosing off easily. Continue to monitor RR and mental status closely, can give additional Narcan and reconsult ICU as needed. Leukocytosis likely reactive, now normalized, will continue IV hydration, and monitor mental status. SW consulted for substance abuse, awaiting evaluation. Possible discharge home tomorrow if mental status improves.

## 2021-08-15 VITALS
TEMPERATURE: 98 F | OXYGEN SATURATION: 100 % | DIASTOLIC BLOOD PRESSURE: 65 MMHG | SYSTOLIC BLOOD PRESSURE: 101 MMHG | HEART RATE: 77 BPM | RESPIRATION RATE: 18 BRPM

## 2021-08-15 PROCEDURE — 99239 HOSP IP/OBS DSCHRG MGMT >30: CPT | Mod: GC

## 2021-08-15 RX ADMIN — SODIUM CHLORIDE 3 MILLILITER(S): 9 INJECTION INTRAMUSCULAR; INTRAVENOUS; SUBCUTANEOUS at 13:17

## 2021-08-15 NOTE — DISCHARGE NOTE PROVIDER - NSDCCPCAREPLAN_GEN_ALL_CORE_FT
PRINCIPAL DISCHARGE DIAGNOSIS  Diagnosis: Opioid overdose  Assessment and Plan of Treatment: You were brought in by emergency medical service to the ED with complaints of unresponsiveness and difficulty breathing. We were not  able to obtain much history as you were intoxicated and  your history was obtained from emergency medical service documentation. As per their documents, your friend had called 911 for the above complaints.  You were found to be lying in bed, responsive only to pain, with constricted pupils, and snorting respirations 5-6/min. This improved to 18/min after two doses 0.5mg Narcan. Once you reached the ED, you were given another 1mg  of Narcan due to poor respiratory effort and were started on Narcan Infusion for possible long acting opioid use. You were drowsy but arousable after Narcan infusion stopped and reported that you drank too much.  ICU was consulted and considered you were stable for medicine floor. You admitted that you were also smoking crystal meth, and claimed that you were injecting meth before but this is the first time you injected Heroin. While on medicine floor you were nearly oriented, however, still dosing off easily. Today your mental status is normal, you are able to engage in conversations, you are talkative and active. Your respiratory frequency is normal. You ambulate well without assistance. You are well and stable for discharge home today.   You have declined outpatient substance abuse rehabilitation service. However, you are strongly recommended to start in outpatient rehabilitation services for substance abuse.       PRINCIPAL DISCHARGE DIAGNOSIS  Diagnosis: Opioid overdose  Assessment and Plan of Treatment: You were brought in by emergency medical service to the ED with complaints of unresponsiveness and difficulty breathing. We were not  able to obtain much history as you were intoxicated and  your history was obtained from emergency medical service documentation. As per their documents, your friend had called 911 for the above complaints.  You were found to be lying in bed, responsive only to pain, with constricted pupils, and snorting respirations 5-6/min. This improved to 18/min after two doses 0.5mg Narcan. Once you reached the ED, you were given another 1mg  of Narcan due to poor respiratory effort and were started on Narcan Infusion for possible long acting opioid use. You were drowsy but arousable after Narcan infusion stopped and reported that you drank too much.  ICU was consulted and considered you were stable for medicine floor. You admitted that you were also smoking crystal meth, and claimed that you were injecting meth before but this is the first time you injected Heroin. While on medicine floor you were nearly oriented, however, still dosing off easily. Today your mental status is normal, you are able to engage in conversations, you are talkative and active. Your respiratory frequency is normal. You ambulate well without assistance. You are well and stable for discharge home today.   You have been seen and evaluated by social work department, you have declined outpatient substance abuse rehabilitation services. You are strongly recommended to start in outpatient rehabilitation services for substance abuse.

## 2021-08-15 NOTE — DISCHARGE NOTE PROVIDER - ATTENDING COMMENTS
29 y/o male who is admitted after overdosing on Heroin requiring Narcan x 3 and Narcan drip. ICU was consulted and felt he was stable for the floor. Patient admits that he was also smoking crystal meth, and claims that he has injected meth before but this is the first time he's injected Heroin. States he lives with his cousin and just started working as a . Leukocytosis was reactive, now normalized, mental status improved, now tolerating PO intake, ambulating independently, is AAOx3 on exam. SW consulted for substance abuse, but patient refused services.      Discharge time spent: 33 minutes

## 2021-08-15 NOTE — DISCHARGE NOTE NURSING/CASE MANAGEMENT/SOCIAL WORK - PATIENT PORTAL LINK FT
You can access the FollowMyHealth Patient Portal offered by [facility] by registering at the following website: [email or web address]. By joining [facility]’s FollowMyHealth portal, you will also be able to view your health information using other applications (apps) compatible with our system.

## 2021-08-15 NOTE — DISCHARGE NOTE PROVIDER - HOSPITAL COURSE
Patient is a 31 y/o male with no significant PMHx who was BIBEMS to the ED with complaints of unresponsiveness and trouble breathing. Patient was unable to provide much history due to intoxication and history is mostly from documentation. As per EMS documents, pts friend had called 911 for the above complaints and he was found to be lying in bed, responsive only to pain, with constricted pupils, and snorting respirations 5-6/min. This improved to 18/min after 0.5mg Narcan x2, and he became conversant. Once reaching the ED, he had to get another 1mg Narcan due to poor respiratory effort and was started on Narcan Infusion for possible long acting opioid use. Patient drowsy but arousable after Narcan infusion stopped and reports that he drank too much.  ICU was consulted and felt he was stable for medicine floor. Patient admits that he was also smoking crystal meth, and claims that he has injected meth before but this is the first time he's injected Heroin. While on medicine floor patient was nearly oriented x 3, however, still dosing off easily. Today patient mental status normal, engages in conversation, talkative and active. Respiratory rate normal. Ambulates well without assistance. .Patient not interested in substance abuse rehabilitation services at this time. Pt well and stable for discharge home.   Patient is a 31 y/o male with no significant PMHx who was BIBEMS to the ED with complaints of unresponsiveness and trouble breathing. Patient was unable to provide much history due to intoxication and history is mostly from documentation. As per EMS documents, pts friend had called 911 for the above complaints and he was found to be lying in bed, responsive only to pain, with constricted pupils, and snorting respirations 5-6/min. This improved to 18/min after 0.5mg Narcan x2, and he became conversant. Once reaching the ED, he had to get another 1mg Narcan due to poor respiratory effort and was started on Narcan Infusion for possible long acting opioid use. Patient drowsy but arousable after Narcan infusion stopped and reports that he drank too much.  ICU was consulted and felt he was stable for medicine floor. Patient admits that he was also smoking crystal meth, and claims that he has injected meth before but this is the first time he's injected Heroin. While on medicine floor patient was nearly oriented x 3, however, still dosing off easily. Today patient mental status normal, engages in conversation, talkative and active. Respiratory rate normal. Ambulates well without assistance. While during hospital stay pt was seen and evaluated by SW, he has declined help for substance abuse rehabilitation services at this time. Pt well and stable for discharge home.

## 2021-08-15 NOTE — CHART NOTE - NSCHARTNOTEFT_GEN_A_CORE
met with patient at the bedside for screening and to offer resources post discharge.  Patient indicated that he did not need help with anything and just wanted to be discharged.  He noted that he was known to a shelter at [address] [place] [address] in [place]. He informed that he would return there possibly for 1 night before finding a room somewhere, possibly [place].  He also informed that he had a metrocard and knew how to get to his next destination.   He had no questions for this worker.  Patient declined and denied the need for any social service.  Patient is cleared for discharge.
BRIEF NOTE:      Admission H&P from this morning reviewed. Patient seen and examined in the ED this afternoon. He is a 29 y/o male who is admitted after overdosing on Heroin requiring Narcan x 3 and Narcan drip. ICU was consulted and felt he was stable for the floor. Patient admits that he was also smoking crystal meth, and claims that he has injected meth before but this is the first time he's injected Heroin. States he lives with his cousin and just started working as a . More alert and now nearly oriented x 3 but still dosing off easily. Continue to monitor RR and mental status closely, can give additional narcan and reconsult ICU as needed. Leukocytosis likely reactive, will place on aggressive IV hydration, start on diet, and repeat labs in AM. SW consulted for substance abuse.       Rest as per admission H&P

## 2022-05-09 NOTE — ED ADULT NURSE NOTE - IS THE PATIENT ABLE TO BE SCREENED?
Yes Cyclosporine Pregnancy And Lactation Text: This medication is Pregnancy Category C and it isn't know if it is safe during pregnancy. This medication is excreted in breast milk.

## 2022-05-09 NOTE — ED CLERICAL - CLERICAL COMMENTS
Writer returned patient call. Due to patient's uncertainy of hitting head post fainting and being on 75 mg Plavix and 81 mg Aspirin daily, patient advised to go to urgent care or ER for increased imaging.       Writer will route this message to PCP and MD Echavarria (patient's cardiologist) Negative for covid as per Julia. Bed assigned @11:48pm
